# Patient Record
Sex: MALE | Race: WHITE | Employment: OTHER | URBAN - METROPOLITAN AREA
[De-identification: names, ages, dates, MRNs, and addresses within clinical notes are randomized per-mention and may not be internally consistent; named-entity substitution may affect disease eponyms.]

---

## 2017-01-01 ENCOUNTER — HOSPITAL ENCOUNTER (OUTPATIENT)
Dept: LAB | Age: 67
Discharge: HOME | End: 2017-02-22
Payer: MEDICARE

## 2017-01-01 ENCOUNTER — OFFICE VISIT (OUTPATIENT)
Dept: FAMILY MEDICINE CLINIC | Age: 67
End: 2017-01-01

## 2017-01-01 ENCOUNTER — TELEPHONE (OUTPATIENT)
Dept: FAMILY MEDICINE CLINIC | Age: 67
End: 2017-01-01

## 2017-01-01 ENCOUNTER — HOSPITAL ENCOUNTER (OUTPATIENT)
Dept: LAB | Age: 67
Discharge: HOME | End: 2017-03-22
Payer: MEDICARE

## 2017-01-01 VITALS
HEIGHT: 71 IN | SYSTOLIC BLOOD PRESSURE: 150 MMHG | TEMPERATURE: 96.7 F | WEIGHT: 239.2 LBS | BODY MASS INDEX: 33.49 KG/M2 | OXYGEN SATURATION: 98 % | RESPIRATION RATE: 16 BRPM | DIASTOLIC BLOOD PRESSURE: 100 MMHG | HEART RATE: 64 BPM

## 2017-01-01 VITALS
DIASTOLIC BLOOD PRESSURE: 88 MMHG | RESPIRATION RATE: 16 BRPM | HEART RATE: 59 BPM | OXYGEN SATURATION: 98 % | SYSTOLIC BLOOD PRESSURE: 151 MMHG | TEMPERATURE: 97.5 F | BODY MASS INDEX: 32.87 KG/M2 | WEIGHT: 234.8 LBS | HEIGHT: 71 IN

## 2017-01-01 VITALS
SYSTOLIC BLOOD PRESSURE: 141 MMHG | HEIGHT: 71 IN | TEMPERATURE: 97.6 F | WEIGHT: 239 LBS | HEART RATE: 57 BPM | OXYGEN SATURATION: 98 % | DIASTOLIC BLOOD PRESSURE: 84 MMHG | BODY MASS INDEX: 33.46 KG/M2 | RESPIRATION RATE: 16 BRPM

## 2017-01-01 DIAGNOSIS — N40.1 BPH WITH OBSTRUCTION/LOWER URINARY TRACT SYMPTOMS: ICD-10-CM

## 2017-01-01 DIAGNOSIS — N13.8 BPH WITH OBSTRUCTION/LOWER URINARY TRACT SYMPTOMS: ICD-10-CM

## 2017-01-01 DIAGNOSIS — Z00.00 ROUTINE GENERAL MEDICAL EXAMINATION AT A HEALTH CARE FACILITY: Primary | ICD-10-CM

## 2017-01-01 DIAGNOSIS — D22.9 SUSPICIOUS NEVUS: Primary | ICD-10-CM

## 2017-01-01 DIAGNOSIS — I10 ESSENTIAL HYPERTENSION, MALIGNANT: Primary | ICD-10-CM

## 2017-01-01 DIAGNOSIS — K21.00 GASTROESOPHAGEAL REFLUX DISEASE WITH ESOPHAGITIS: ICD-10-CM

## 2017-01-01 DIAGNOSIS — D21.0 BENIGN NEOPLASM OF CONNECTIVE AND OTHER SOFT TISSUE OF HEAD, FACE AND NECK: ICD-10-CM

## 2017-01-01 DIAGNOSIS — G89.4 CHRONIC PAIN SYNDROME: ICD-10-CM

## 2017-01-01 DIAGNOSIS — Z00.00 ROUTINE GENERAL MEDICAL EXAMINATION AT A HEALTH CARE FACILITY: ICD-10-CM

## 2017-01-01 DIAGNOSIS — I10 ESSENTIAL HYPERTENSION: ICD-10-CM

## 2017-01-01 DIAGNOSIS — E78.00 PURE HYPERCHOLESTEROLEMIA: ICD-10-CM

## 2017-01-01 LAB
ALBUMIN SERPL BCP-MCNC: 4.5 G/DL (ref 3.4–5)
ALBUMIN/GLOB SERPL: 1.5 {RATIO} (ref 0.8–1.7)
ALP SERPL-CCNC: 88 U/L (ref 45–117)
ALT SERPL-CCNC: 33 U/L (ref 16–61)
ANION GAP BLD CALC-SCNC: 7 MMOL/L (ref 3–18)
APPEARANCE UR: CLEAR
AST SERPL W P-5'-P-CCNC: 19 U/L (ref 15–37)
BACTERIA URNS QL MICRO: ABNORMAL /HPF
BASOPHILS # BLD AUTO: 0 K/UL (ref 0–0.06)
BASOPHILS # BLD: 0 % (ref 0–2)
BILIRUB SERPL-MCNC: 0.7 MG/DL (ref 0.2–1)
BILIRUB UR QL: NEGATIVE
BUN SERPL-MCNC: 11 MG/DL (ref 7–18)
BUN/CREAT SERPL: 10 (ref 12–20)
CALCIUM SERPL-MCNC: 10 MG/DL (ref 8.5–10.1)
CHLORIDE SERPL-SCNC: 101 MMOL/L (ref 100–108)
CHOLEST SERPL-MCNC: 238 MG/DL
CO2 SERPL-SCNC: 30 MMOL/L (ref 21–32)
COLOR UR: YELLOW
CREAT SERPL-MCNC: 1.06 MG/DL (ref 0.6–1.3)
DIFFERENTIAL METHOD BLD: ABNORMAL
EOSINOPHIL # BLD: 0.1 K/UL (ref 0–0.4)
EOSINOPHIL NFR BLD: 2 % (ref 0–5)
EPITH CASTS URNS QL MICRO: NEGATIVE /LPF (ref 0–5)
ERYTHROCYTE [DISTWIDTH] IN BLOOD BY AUTOMATED COUNT: 12.6 % (ref 11.6–14.5)
GLOBULIN SER CALC-MCNC: 3.1 G/DL (ref 2–4)
GLUCOSE SERPL-MCNC: 133 MG/DL (ref 74–99)
GLUCOSE UR STRIP.AUTO-MCNC: NEGATIVE MG/DL
HCT VFR BLD AUTO: 49.4 % (ref 36–48)
HDLC SERPL-MCNC: 43 MG/DL (ref 40–60)
HDLC SERPL: 5.5 {RATIO} (ref 0–5)
HGB BLD-MCNC: 16.5 G/DL (ref 13–16)
HGB UR QL STRIP: ABNORMAL
KETONES UR QL STRIP.AUTO: NEGATIVE MG/DL
LDLC SERPL CALC-MCNC: 148.8 MG/DL (ref 0–100)
LEUKOCYTE ESTERASE UR QL STRIP.AUTO: ABNORMAL
LIPID PROFILE,FLP: ABNORMAL
LYMPHOCYTES # BLD AUTO: 35 % (ref 21–52)
LYMPHOCYTES # BLD: 2.2 K/UL (ref 0.9–3.6)
MCH RBC QN AUTO: 32.5 PG (ref 24–34)
MCHC RBC AUTO-ENTMCNC: 33.4 G/DL (ref 31–37)
MCV RBC AUTO: 97.4 FL (ref 74–97)
MONOCYTES # BLD: 0.4 K/UL (ref 0.05–1.2)
MONOCYTES NFR BLD AUTO: 6 % (ref 3–10)
NEUTS SEG # BLD: 3.5 K/UL (ref 1.8–8)
NEUTS SEG NFR BLD AUTO: 57 % (ref 40–73)
NITRITE UR QL STRIP.AUTO: NEGATIVE
PH UR STRIP: 6 [PH] (ref 5–8)
PLATELET # BLD AUTO: 315 K/UL (ref 135–420)
PMV BLD AUTO: 10.2 FL (ref 9.2–11.8)
POTASSIUM SERPL-SCNC: 4.8 MMOL/L (ref 3.5–5.5)
PROT SERPL-MCNC: 7.6 G/DL (ref 6.4–8.2)
PROT UR STRIP-MCNC: NEGATIVE MG/DL
RBC # BLD AUTO: 5.07 M/UL (ref 4.7–5.5)
RBC #/AREA URNS HPF: ABNORMAL /HPF (ref 0–5)
SODIUM SERPL-SCNC: 138 MMOL/L (ref 136–145)
SP GR UR REFRACTOMETRY: 1.01 (ref 1–1.03)
TRIGL SERPL-MCNC: 231 MG/DL (ref ?–150)
TSH SERPL DL<=0.05 MIU/L-ACNC: 0.52 UIU/ML (ref 0.36–3.74)
UROBILINOGEN UR QL STRIP.AUTO: 0.2 EU/DL (ref 0.2–1)
VLDLC SERPL CALC-MCNC: 46.2 MG/DL
WBC # BLD AUTO: 6.2 K/UL (ref 4.6–13.2)
WBC URNS QL MICRO: ABNORMAL /HPF (ref 0–4)

## 2017-01-01 PROCEDURE — 85025 COMPLETE CBC W/AUTO DIFF WBC: CPT | Performed by: FAMILY MEDICINE

## 2017-01-01 PROCEDURE — 80053 COMPREHEN METABOLIC PANEL: CPT | Performed by: FAMILY MEDICINE

## 2017-01-01 PROCEDURE — 84443 ASSAY THYROID STIM HORMONE: CPT | Performed by: FAMILY MEDICINE

## 2017-01-01 PROCEDURE — 81001 URINALYSIS AUTO W/SCOPE: CPT | Performed by: FAMILY MEDICINE

## 2017-01-01 PROCEDURE — 80061 LIPID PANEL: CPT | Performed by: FAMILY MEDICINE

## 2017-01-01 PROCEDURE — 88305 TISSUE EXAM BY PATHOLOGIST: CPT | Performed by: FAMILY MEDICINE

## 2017-01-01 PROCEDURE — 36415 COLL VENOUS BLD VENIPUNCTURE: CPT | Performed by: FAMILY MEDICINE

## 2017-01-01 RX ORDER — TRAMADOL HYDROCHLORIDE 50 MG/1
50 TABLET ORAL
Qty: 90 TAB | Refills: 5 | Status: SHIPPED | OUTPATIENT
Start: 2017-01-01

## 2017-01-01 RX ORDER — OMEPRAZOLE 20 MG/1
20 CAPSULE, DELAYED RELEASE ORAL DAILY
Qty: 90 CAP | Refills: 4 | Status: SHIPPED | OUTPATIENT
Start: 2017-01-01

## 2017-01-01 RX ORDER — BISOPROLOL FUMARATE AND HYDROCHLOROTHIAZIDE 5; 6.25 MG/1; MG/1
1 TABLET ORAL DAILY
Qty: 90 TAB | Refills: 4 | Status: SHIPPED | OUTPATIENT
Start: 2017-01-01

## 2017-02-22 NOTE — PROGRESS NOTES
1. Have you been to the ER, urgent care clinic since your last visit? Hospitalized since your last visit? No    2. Have you seen or consulted any other health care providers outside of the [address] [place] [address] since your last visit? Include any pap smears or colon screening. No     Patient is present for a Medicare Wellness exam. Patient complains of 0 pain level on scale of 0-10.

## 2017-02-22 NOTE — ACP (ADVANCE CARE PLANNING)
Advance Care Planning (ACP) Provider Note - Comprehensive     Date of ACP Conversation: 02/22/17  Persons included in Conversation:  patient  Length of ACP Conversation in minutes:  <16 minutes (Non-Billable)    Authorized Decision Maker (if patient is incapable of making informed decisions): This person is:  Healthcare Agent/Medical Power of  under Advance Directive          General ACP for ALL Patients with Decision Making Capacity:   Importance of advance care planning, including choosing a healthcare agent to communicate patient's healthcare decisions if patient lost the ability to make decisions, such as after a sudden illness or accident    Review of Existing Advance Directive:       For Serious or Chronic Illness:  Understanding of medical condition      Interventions Provided:  Reviewed existing Advance Directive

## 2017-02-22 NOTE — MR AVS SNAPSHOT
Visit Information Date & Time Provider Department Dept. Phone Encounter #  
 2/22/2017  7:30 AM Dominga Houston [phone] [id number] Follow-up Instructions Return in about 4 weeks (around 3/22/2017) for procedure removal of scalp lesion. Upcoming Health Maintenance Date Due  
 MEDICARE YEARLY EXAM 2/23/2018 GLAUCOMA SCREENING Q2Y 2/22/2019 DTaP/Tdap/Td series (2 - Td) 4/1/2022 COLONOSCOPY 9/8/2025 Allergies as of 2/22/2017  Review Complete On: 2/22/2017 By: Karina Olvera., DO No Known Allergies Current Immunizations  Reviewed on 2/23/2016 Name Date Influenza High Dose Vaccine PF 9/20/2016 Influenza Vaccine 2/23/2016 Influenza Vaccine Split 10/23/2012 Pneumococcal Conjugate (PCV-13) 2/23/2016 Pneumococcal Polysaccharide (PPSV-23) 1/24/2013 TDAP Vaccine 4/1/2012 Zoster Vaccine, Live 1/24/2013 Not reviewed this visit You Were Diagnosed With   
  
 Codes Comments Routine general medical examination at a health care facility    -  Primary ICD-10-CM: Z00.00 ICD-9-CM: V70.0 Essential hypertension     ICD-10-CM: I10 
ICD-9-CM: 401.9 BPH with obstruction/lower urinary tract symptoms     ICD-10-CM: N40.1, N13.8 ICD-9-CM: 600.01, 599.69 Chronic pain syndrome     ICD-10-CM: G89.4 ICD-9-CM: 338.4 Pure hypercholesterolemia     ICD-10-CM: E78.00 ICD-9-CM: 272.0 Gastroesophageal reflux disease with esophagitis     ICD-10-CM: K21.0 ICD-9-CM: 530.11 Vitals BP  
  
  
  
  
  
 151/88 (BP 1 Location: Right arm, BP Patient Position: Sitting) BMI and BSA Data Body Mass Index Body Surface Area 32.75 kg/m 2 2.31 m 2 Preferred Pharmacy Pharmacy Name Phone [facility] [phone] Your Updated Medication List  
  
   
 This list is accurate as of: 2/22/17  7:59 AM.  Always use your most recent med list.  
  
  
  
  
 bisoprolol-hydroCHLOROthiazide 5-6.25 mg per tablet Commonly known as:  [facility] Take 1 Tab by mouth daily. multivitamin tablet Commonly known as:  ONE A DAY Take 1 Tab by mouth daily. omeprazole 20 mg capsule Commonly known as:  PriLOSEC Take 1 Cap by mouth daily. tadalafil 5 mg tablet Commonly known as:  CIALIS Take 1 Tab by mouth daily. traMADol 50 mg tablet Commonly known as:  ULTRAM  
Take 1 Tab by mouth every eight (8) hours as needed for Pain. Max Daily Amount: 150 mg. Indications: Pain Prescriptions Printed Refills  
 traMADol (ULTRAM) 50 mg tablet 5 Sig: Take 1 Tab by mouth every eight (8) hours as needed for Pain. Max Daily Amount: 150 mg. Indications: Pain Class: Print Route: Oral  
  
Prescriptions Sent to Pharmacy Refills  
 bisoprolol-hydroCHLOROthiazide (ZIAC) 5-6.25 mg per tablet 4 Sig: Take 1 Tab by mouth daily. Class: Normal  
 Pharmacy: [address] Ph #: [phone] Route: Oral  
 omeprazole (PRILOSEC) 20 mg capsule 4 Sig: Take 1 Cap by mouth daily. Class: Normal  
 Pharmacy: [address] Ph #: [phone] Route: Oral  
  
We Performed the Following AMB POC EKG ROUTINE W/ 12 LEADS, INTER & REP [46477 CPT(R)] Comments:  
 Verbal order with read back per Dr. Jeronimo Lisa request  
  
Follow-up Instructions Return in about 4 weeks (around 3/22/2017) for procedure removal of scalp lesion. To-Do List   
 02/22/2017 Lab:  CBC WITH AUTOMATED DIFF   
  
 02/22/2017 Lab:  LIPID PANEL   
  
 02/22/2017 Lab:  METABOLIC PANEL, COMPREHENSIVE   
  
 02/22/2017 Lab:  TSH 3RD GENERATION   
  
 02/22/2017 Lab: URINALYSIS W/ RFLX MICROSCOPIC Introducing [facility]! Dear Miroslava Cornelius: Thank you for requesting a [email or web address] account. Our records indicate that you already have an active [email or web address] account. You can access your account anytime at [email or web address] Did you know that you can access your hospital and ER discharge instructions at any time in [email or web address]? You can also review all of your test results from your hospital stay or ER visit. Additional Information If you have questions, please visit the Frequently Asked Questions section of the [email or web address] website at [email or web address] Remember, [email or web address] is NOT to be used for urgent needs. For medical emergencies, dial 911. Now available from your iPhone and Android! Please provide this summary of care documentation to your next provider. Your primary care clinician is listed as [address] [place] [address]. If you have any questions after today's visit, please call [phone].

## 2017-02-22 NOTE — PROGRESS NOTES
THE SUBSEQUENT MEDICARE ANNUAL WELLNESS VISIT PROGRESS NOTES    This is a Subsequent Medicare Annual Wellness Visit providing Personalized Prevention Plan Services (PPPS) (Performed 12 months after initial AWV and PPPS )    I have reviewed the patient's medical history in detail and updated the computerized patient record. Jessy Conklin is a 78 y.o.  male and presents for an subsequent annual wellness exam     Patient Active Problem List    Diagnosis Date Noted    BPH with obstruction/lower urinary tract symptoms 10/18/2016    Chronic pain 06/16/2015    HTN (hypertension) 10/23/2012    Pure hypercholesterolemia 10/23/2012     Current Outpatient Prescriptions   Medication Sig Dispense Refill    bisoprolol-hydroCHLOROthiazide (ZIAC) 5-6.25 mg per tablet Take 1 Tab by mouth daily. 90 Tab 4    traMADol (ULTRAM) 50 mg tablet Take 1 Tab by mouth every eight (8) hours as needed for Pain. Max Daily Amount: 150 mg. Indications: Pain 90 Tab 5    omeprazole (PRILOSEC) 20 mg capsule Take 1 Cap by mouth daily. 90 Cap 4    multivitamin (ONE A DAY) tablet Take 1 Tab by mouth daily.  tadalafil (CIALIS) 5 mg tablet Take 1 Tab by mouth daily. (Patient taking differently: Take 5 mg by mouth every other day.) 90 Tab 3     No Known Allergies  Past Medical History:   Diagnosis Date    Chronic pain 6/16/2015    lower back pinched nerve    Dribbling urine     Hypertension     Incomplete bladder emptying     Urgency of urination     Urinary hesitancy     Urinary urgency      Past Surgical History:   Procedure Laterality Date    HX HEENT      tonsils    HX UROLOGICAL  10/18/2016    Cysto:  Normal, except for prostate:  Trilobar hypertrophy, mainly median lobe, short, approximately 2 cm in length  Dr. Prince Lau, in office.        Family History   Problem Relation Age of Onset    Hypertension Father      Social History   Substance Use Topics    Smoking status: Former Smoker     Quit date: 10/23/1980    Smokeless tobacco: Former User    Alcohol use No         ROS       All other systems reviewed and are negative. History     Past Medical History:   Diagnosis Date    Chronic pain 6/16/2015    lower back pinched nerve    Dribbling urine     Hypertension     Incomplete bladder emptying     Urgency of urination     Urinary hesitancy     Urinary urgency       Past Surgical History:   Procedure Laterality Date    HX HEENT      tonsils    HX UROLOGICAL  10/18/2016    Cysto:  Normal, except for prostate:  Trilobar hypertrophy, mainly median lobe, short, approximately 2 cm in length  Dr. Yazan Huddleston, in office. Current Outpatient Prescriptions   Medication Sig Dispense Refill    bisoprolol-hydroCHLOROthiazide (ZIAC) 5-6.25 mg per tablet Take 1 Tab by mouth daily. 90 Tab 4    traMADol (ULTRAM) 50 mg tablet Take 1 Tab by mouth every eight (8) hours as needed for Pain. Max Daily Amount: 150 mg. Indications: Pain 90 Tab 5    omeprazole (PRILOSEC) 20 mg capsule Take 1 Cap by mouth daily. 90 Cap 4    multivitamin (ONE A DAY) tablet Take 1 Tab by mouth daily.  tadalafil (CIALIS) 5 mg tablet Take 1 Tab by mouth daily.  (Patient taking differently: Take 5 mg by mouth every other day.) 90 Tab 3     No Known Allergies  Family History   Problem Relation Age of Onset    Hypertension Father      Social History   Substance Use Topics    Smoking status: Former Smoker     Quit date: 10/23/1980    Smokeless tobacco: Former User    Alcohol use No     Patient Active Problem List   Diagnosis Code    HTN (hypertension) I10    Pure hypercholesterolemia E78.00    Chronic pain G89.29    BPH with obstruction/lower urinary tract symptoms N40.1, N13.8       Health Maintenance History  Immunizations reviewed, dtap utd  , pneumovax utd , flu utd , zoster utd   Colonoscopy: utd ,   Chest CT : na ,  Eye exam: utd     Health Care Directive or Living Will: yes    Depression Risk Factor Screening:      Patient Health Questionnaire (PHQ-2)   Over the last 2 weeks, how often have you been bothered by any of the following problems? · Little interest or pleasure in doing things? · Not at all. [0]  · Feeling down, depressed, or hopeless? · Not at all. [0]    Total Score: 0/6  PHQ-2 Assessment Scoring:   A score of 2 or more requires further screening with the PHQ-9    Alcohol Risk Factor Screening:     Women: On any occasion during the past 3 months, have you had more than 3 drinks containing alcohol? Do you average more than 7 drinks per week? Men: On any occasion during the past 3 months, have you had more than 4 drinks containing alcohol? Do you average more than 14 drinks per week? Functional Ability and Level of Safety:     Hearing Loss    mild    Activities of Daily Living   Self-care. Requires assistance with: no ADLs    Fall Risk   No fall risk factors    Abuse Screen   None      Examination   Physical Examination  Vitals:    02/22/17 0738   BP: 151/88   Pulse: (!) 59   Resp: 16   Temp: 97.5 °F (36.4 °C)   TempSrc: Oral   SpO2: 98%   Weight: 234 lb 12.8 oz (106.5 kg)   Height: 5' 11\" (1.803 m)   PainSc:   0 - No pain     Body mass index is 32.75 kg/(m^2). Evaluation of Cognitive Function:  Mood/affect:appropriate   Appearance: well groomed   Family member/caregiver input: na     alert, well appearing, and in no distress, oriented to person, place, and time and normal appearing weight    Patient Care Team:  Theodore Jay, DO as PCP - General (Family Practice)  Thedoore Jay DO (Family Practice)  Karel Prado MD (Urology)    Advice/Referrals/Counseling/Plan:   Education and counseling provided:  Are appropriate based on today's review and evaluation  Include in education list (weight loss, physical activity, smoking cessation, fall prevention, and nutrition)  current treatment plan is effective, no change in therapy.     I have discussed the diagnosis with the patient and the intended plan as seen in the above orders. The patient has received an after-visit summary and questions were answered concerning future plans. I have discussed medication side effects and warnings with the patient as well. I have reviewed the plan of care with the patient, accepted their input and they are in agreement with the treatment goals. Follow-up Disposition:  Return in about 4 weeks (around 3/22/2017) for procedure removal of scalp lesion. _____________________________________________________________    Problem Assessment    for treatment of   Chief Complaint   Patient presents with    Benign Prostatic Hypertrophy    Hypertension    Cholesterol Problem    Pain (Chronic)         SUBJECTIVE      Cardiovascular Review:  The patient has hypertension and hyperlipidemia. Diet and Lifestyle: not attempting to follow a low fat, low cholesterol diet, not attempting to follow a low sodium diet  Home BP Monitoring: is not measured at home. Pertinent ROS: taking medications as instructed, no medication side effects noted, no TIA's, no chest pain on exertion, no dyspnea on exertion, no swelling of ankles. Osteoarthritis and Chronic Pain:  Patient has osteoarthritis, primarily affecting the diffuse. Symptoms onset: problem is longstanding. Rheumatological ROS: no current joint or muscle symptoms, essentially pain-free. Response to treatment plan: stable. bph ongoing    Additional Concerns:        Visit Vitals    /88 (BP 1 Location: Right arm, BP Patient Position: Sitting)    Pulse (!) 59    Temp 97.5 °F (36.4 °C) (Oral)    Resp 16    Ht 5' 11\" (1.803 m)    Wt 234 lb 12.8 oz (106.5 kg)    SpO2 98%    BMI 32.75 kg/m2     General:  Alert, cooperative, no distress, appears stated age. Head:  Normocephalic, without obvious abnormality, atraumatic. Eyes:  Conjunctivae/corneas clear. PERRL, EOMs intact. Fundi benign   Ears:  Normal TMs and external ear canals both ears.    Nose: Nares normal. Septum midline. Mucosa normal. No drainage or sinus tenderness. Throat: Lips, mucosa, and tongue normal. Teeth and gums normal.   Neck: Supple, symmetrical, trachea midline, no adenopathy, thyroid: no enlargement/tenderness/nodules, no carotid bruit and no JVD. Back:   Symmetric, no curvature. ROM normal. No CVA tenderness. Lungs:   Clear to auscultation bilaterally. Chest wall:  No tenderness or deformity. Heart:  Regular rate and rhythm, S1, S2 normal, no murmur, click, rub or gallop. Abdomen:   Soft, non-tender. Bowel sounds normal. No masses,  No organomegaly. Genitalia:  Normal male without lesion, discharge or tenderness. Rectal:  Normal tone, normal prostate, no masses or tenderness  Guaiac negative stool. Extremities: Extremities normal, atraumatic, no cyanosis or edema. Pulses: 2+ and symmetric all extremities. Skin: Skin color, texture, turgor normal. No rashes or lesions   Lymph nodes: Cervical, supraclavicular, and axillary nodes normal.   Neurologic: CNII-XII intact. Normal strength, sensation and reflexes throughout.            LABS   Ordered   TESTS  ekg  nsr    Assessment/Plan:      Hypertension - stable  Hyperlipidemia - stable  Chronic pain ongiong  bph improved        Lab review: orders written for new lab studies as appropriate; see orders

## 2017-03-22 NOTE — PROGRESS NOTES
Subjective:    Saranya Daley is a 78 y.o. male who presents for lesion removal. We have discussed this procedure, including option of not performing surgery, technique of surgery and potential for scarring at an earlier visit. Oubjective:   Patient appears well. Visit Vitals    BP (!) 150/100 (BP 1 Location: Right arm, BP Patient Position: Sitting)    Pulse 64    Temp 96.7 °F (35.9 °C) (Oral)    Resp 16    Ht 5' 11\" (1.803 m)    Wt 239 lb 3.2 oz (108.5 kg)    SpO2 98%    BMI 33.36 kg/m2     Skin: 1.5 cm califlower lesion on left scalp    [facility]  OFFICE PROCEDURE PROGRESS NOTE        Chart reviewed for the following:   Delmi VERAS DO, have reviewed the History, Physical and updated the Allergic reactions for [address] [place] [address] [place] [address] performed immediately prior to start of procedure:   Delmi VERAS DO, have performed the following reviews on Saranya Daley prior to the start of the procedure:            * Patient was identified by name and date of birth   * Agreement on procedure being performed was verified  * Risks and Benefits explained to the patient  * Procedure site verified and marked as necessary  * Patient was positioned for comfort  * Consent was signed and verified     Time: 830      Date of procedure: 3/22/2017    Procedure performed by:  Delmi Willis DO    Provider assisted by: qiana MIXON    Patient assisted by: wife    How tolerated by patient: tolerated the procedure well with no complications    Post Procedural Pain Scale: 0 - No Hurt    Comments: none              Assessment:   suspicious lesion    Procedure Note:   After informed consent was obtained, using alcohol for cleansing and 1% lidocaine with epinephrine for anesthetic, with sterile technique, elliptical excision in total was performed. rekpaired with 2. 3-0 ethilon sutures . Antibiotic dressing is applied, and wound care instructions provided.   Be alert for any signs of cutaneous infection. The procedure was well tolerated without complications. Follow up: the specimen is labeled and sent to pathology for evaluation, return for suture removal in 10 days.

## 2017-03-22 NOTE — PROGRESS NOTES
1. Have you been to the ER, urgent care clinic since your last visit? Hospitalized since your last visit? No    2. Have you seen or consulted any other health care providers outside of the [address] [place] [address] since your last visit? Include any pap smears or colon screening.  No

## 2017-03-22 NOTE — MR AVS SNAPSHOT
Visit Information Date & Time Provider Department Dept. Phone Encounter #  
 3/22/2017  8:00 AM Karina May., [address] [facility] [phone] [id number] Follow-up Instructions Return in about 10 days (around 4/1/2017) for procedure suture removal. Upcoming Health Maintenance Date Due  
 MEDICARE YEARLY EXAM 2/23/2018 GLAUCOMA SCREENING Q2Y 2/22/2019 DTaP/Tdap/Td series (2 - Td) 4/1/2022 COLONOSCOPY 9/8/2025 Allergies as of 3/22/2017  Review Complete On: 2/22/2017 By: Kraina Olvera., DO No Known Allergies Current Immunizations  Reviewed on 2/23/2016 Name Date Influenza High Dose Vaccine PF 9/20/2016 Influenza Vaccine 2/23/2016 Influenza Vaccine Split 10/23/2012 Pneumococcal Conjugate (PCV-13) 2/23/2016 Pneumococcal Polysaccharide (PPSV-23) 1/24/2013 TDAP Vaccine 4/1/2012 Zoster Vaccine, Live 1/24/2013 Not reviewed this visit You Were Diagnosed With   
  
 Codes Comments Suspicious nevus    -  Primary ICD-10-CM: D22.9 ICD-9-CM: 238.2 Benign neoplasm of connective and other soft tissue of head, face and neck     ICD-10-CM: D21.0 ICD-9-CM: 215.0 Vitals BP Pulse Temp Resp Height(growth percentile) Weight(growth percentile) (!) 150/100 (BP 1 Location: Right arm, BP Patient Position: Sitting) 64 96.7 °F (35.9 °C) (Oral) 16 5' 11\" (1.803 m) 239 lb 3.2 oz (108.5 kg) SpO2 BMI Smoking Status 98% 33.36 kg/m2 Former Smoker BMI and BSA Data Body Mass Index Body Surface Area  
 33.36 kg/m 2 2.33 m 2 Preferred Pharmacy Pharmacy Name Phone [facility] [address] [place] [address] [address] [phone] Your Updated Medication List  
  
   
This list is accurate as of: 3/22/17  8:20 AM.  Always use your most recent med list.  
  
  
  
  
 bisoprolol-hydroCHLOROthiazide 5-6.25 mg per tablet Commonly known as:  L[facility]ECARE [facility] Take 1 Tab by mouth daily. multivitamin tablet Commonly known as:  ONE A DAY Take 1 Tab by mouth daily. omeprazole 20 mg capsule Commonly known as:  PriLOSEC Take 1 Cap by mouth daily. tadalafil 5 mg tablet Commonly known as:  CIALIS Take 1 Tab by mouth daily. traMADol 50 mg tablet Commonly known as:  ULTRAM  
Take 1 Tab by mouth every eight (8) hours as needed for Pain. Max Daily Amount: 150 mg. Indications: Pain We Performed the Following EXC SKIN BENIG 1.1-2CM REMAINDR BODY [24104 CPT(R)] Follow-up Instructions Return in about 10 days (around 4/1/2017) for procedure suture removal.  
  
To-Do List   
 03/22/2017 Pathology:  SURGICAL PATHOLOGY Introducing [facility]! Dear Willie Osorio: Thank you for requesting a BESOS account. Our records indicate that you already have an active BESOS account. You can access your account anytime at [email or web address] Did you know that you can access your hospital and ER discharge instructions at any time in BESOS? You can also review all of your test results from your hospital stay or ER visit. Additional Information If you have questions, please visit the Frequently Asked Questions section of the BESOS website at [email or web address] Remember, BESOS is NOT to be used for urgent needs. For medical emergencies, dial 911. Now available from your iPhone and Android! Please provide this summary of care documentation to your next provider. Your primary care clinician is listed as [id number] [facility]. If you have any questions after today's visit, please call [phone].

## 2017-04-03 NOTE — PROGRESS NOTES
Jace Burr. is a 78 y.o. male presented to clinic for removal of sutures placed 10 days ago to the back of the head. Pt denies any pain or concerns at this time. 1. Have you been to the ER, urgent care clinic since your last visit? Hospitalized since your last visit? No    2. Have you seen or consulted any other health care providers outside of the [address] [place] [address] since your last visit? Include any pap smears or colon screening.  No     Learning Assessment 6/16/2015   PRIMARY LEARNER Patient   HIGHEST LEVEL OF EDUCATION - PRIMARY LEARNER  GRADUATED HIGH SCHOOL OR GED   BARRIERS PRIMARY LEARNER NONE   CO-LEARNER CAREGIVER No   PRIMARY LANGUAGE ENGLISH   LEARNER PREFERENCE PRIMARY LISTENING   ANSWERED BY patient   RELATIONSHIP SELF   '

## 2017-04-03 NOTE — MR AVS SNAPSHOT
Visit Information Date & Time Provider Department Dept. Phone Encounter #  
 4/3/2017  8:00 AM Earle Byrnes 6 [phone] [id number] Follow-up Instructions Return in about 1 year (around 4/3/2018) for physical, labs at next visit, EKG next visit. Follow-up and Disposition History Upcoming Health Maintenance Date Due  
 MEDICARE YEARLY EXAM 2/23/2018 GLAUCOMA SCREENING Q2Y 2/22/2019 DTaP/Tdap/Td series (2 - Td) 4/1/2022 COLONOSCOPY 9/8/2025 Allergies as of 4/3/2017  Review Complete On: 4/3/2017 By: Katherine Rivera., DO No Known Allergies Current Immunizations  Reviewed on 2/23/2016 Name Date Influenza High Dose Vaccine PF 9/20/2016 Influenza Vaccine 2/23/2016 Influenza Vaccine Split 10/23/2012 Pneumococcal Conjugate (PCV-13) 2/23/2016 Pneumococcal Polysaccharide (PPSV-23) 1/24/2013 TDAP Vaccine 4/1/2012 Zoster Vaccine, Live 1/24/2013 Not reviewed this visit You Were Diagnosed With   
  
 Codes Comments Essential hypertension, malignant    -  Primary ICD-10-CM: I10 
ICD-9-CM: 401.0 Vitals BP Pulse Temp Resp Height(growth percentile) Weight(growth percentile) 141/84 (BP 1 Location: Left arm, BP Patient Position: Sitting) (!) 57 97.6 °F (36.4 °C) (Oral) 16 5' 11\" (1.803 m) 239 lb (108.4 kg) SpO2 BMI Smoking Status 98% 33.33 kg/m2 Former Smoker BMI and BSA Data Body Mass Index Body Surface Area  
 33.33 kg/m 2 2.33 m 2 Preferred Pharmacy Pharmacy Name Phone [facility] [address] [place] [address] [address] [phone] Your Updated Medication List  
  
   
This list is accurate as of: 4/3/17  8:31 AM.  Always use your most recent med list.  
  
  
  
  
 bisoprolol-hydroCHLOROthiazide 5-6.25 mg per tablet Commonly known as:  [facility] Take 1 Tab by mouth daily. multivitamin tablet Commonly known as:  ONE A DAY Take 1 Tab by mouth daily. omeprazole 20 mg capsule Commonly known as:  PriLOSEC Take 1 Cap by mouth daily. tadalafil 5 mg tablet Commonly known as:  CIALIS Take 1 Tab by mouth daily. traMADol 50 mg tablet Commonly known as:  ULTRAM  
Take 1 Tab by mouth every eight (8) hours as needed for Pain. Max Daily Amount: 150 mg. Indications: Pain Follow-up Instructions Return in about 1 year (around 4/3/2018) for physical, labs at next visit, EKG next visit. Introducing [facility]! Dear Jordan Wilson: Thank you for requesting a [employer] account. Our records indicate that you already have an active [employer] account. You can access your account anytime at [email or web address] Did you know that you can access your hospital and ER discharge instructions at any time in [employer]? You can also review all of your test results from your hospital stay or ER visit. Additional Information If you have questions, please visit the Frequently Asked Questions section of the [employer] website at [email or web address] Remember, [employer] is NOT to be used for urgent needs. For medical emergencies, dial 911. Now available from your iPhone and Android! Please provide this summary of care documentation to your next provider. Your primary care clinician is listed as [id number] [facility]. If you have any questions after today's visit, please call [phone].